# Patient Record
Sex: FEMALE | Race: WHITE | NOT HISPANIC OR LATINO | URBAN - NONMETROPOLITAN AREA
[De-identification: names, ages, dates, MRNs, and addresses within clinical notes are randomized per-mention and may not be internally consistent; named-entity substitution may affect disease eponyms.]

---

## 2020-12-02 ENCOUNTER — IMPORTED ENCOUNTER (OUTPATIENT)
Dept: URBAN - NONMETROPOLITAN AREA CLINIC 1 | Facility: CLINIC | Age: 50
End: 2020-12-02

## 2020-12-02 PROBLEM — H00.015: Noted: 2020-12-02

## 2020-12-02 PROCEDURE — 92285 EXTERNAL OCULAR PHOTOGRAPHY: CPT

## 2020-12-02 PROCEDURE — 99203 OFFICE O/P NEW LOW 30 MIN: CPT

## 2020-12-02 NOTE — PATIENT DISCUSSION
External Hordeolum LLL Discussed diagnosis with patient Discussed surgical removal and RBA's and recommend procedure to remove. Patient elects to proceed with removal. Will try to do this on next week for patient Task sent to [facility] to call and schedule. [facility] is [facility].

## 2020-12-08 ENCOUNTER — IMPORTED ENCOUNTER (OUTPATIENT)
Dept: URBAN - NONMETROPOLITAN AREA CLINIC 1 | Facility: CLINIC | Age: 50
End: 2020-12-08

## 2020-12-08 PROCEDURE — 67700 BLEPHAROTOMY DRG ABSC EYELID: CPT

## 2020-12-08 NOTE — PATIENT DISCUSSION
Inrternal Residual Hordeolum LLL - Discussed diagnosis in detail with patient - S/P Lesion removal LLL - Incision looks good with palpable nodule irritation and pain - Recommend HOT compresses 10-15 mins on and 45 mins off - Start Keflex 500mg BID x 7 days RX sent to pharmacy - May consider consult back with Dr. Seven Meier  - Continue to [facility] - RTC PRN or malini with Dr. Seven Meier

## 2020-12-17 ENCOUNTER — IMPORTED ENCOUNTER (OUTPATIENT)
Dept: URBAN - NONMETROPOLITAN AREA CLINIC 1 | Facility: CLINIC | Age: 50
End: 2020-12-17

## 2020-12-17 PROCEDURE — 99024 POSTOP FOLLOW-UP VISIT: CPT

## 2020-12-17 NOTE — PATIENT DISCUSSION
Inrternal Residual Hordeolum LLL - Discussed diagnosis in detail with patient - S/P Lesion removal LLL - Incision looks good with palpable nodule irritation and pain - Recommend HOT compresses 10-15 mins on and 45 mins off - Start Keflex 500mg BID x 7 days RX sent to pharmacy - May consider consult back with Dr. Jennifer Abdi  - Continue to [facility] - RTC PRN or malini with Dr. Jennifer Abdi

## 2022-04-09 ASSESSMENT — VISUAL ACUITY
OS_SC: 20/40
OS_PH: 20/20
OD_SC: 20/20
OS_SC: 20/20
OD_SC: 20/20

## 2023-06-13 ENCOUNTER — EMERGENCY VISIT (OUTPATIENT)
Dept: URBAN - NONMETROPOLITAN AREA CLINIC 1 | Facility: CLINIC | Age: 53
End: 2023-06-13

## 2023-06-13 DIAGNOSIS — H16.142: ICD-10-CM

## 2023-06-13 PROCEDURE — 99213 OFFICE O/P EST LOW 20 MIN: CPT

## 2023-06-13 ASSESSMENT — TONOMETRY
OS_IOP_MMHG: 17
OD_IOP_MMHG: 17

## 2023-06-13 ASSESSMENT — VISUAL ACUITY
OD_CC: 20/20-2
OS_CC: 20/25